# Patient Record
Sex: MALE | Race: WHITE | NOT HISPANIC OR LATINO
[De-identification: names, ages, dates, MRNs, and addresses within clinical notes are randomized per-mention and may not be internally consistent; named-entity substitution may affect disease eponyms.]

---

## 2020-04-12 PROBLEM — Z00.00 ENCOUNTER FOR PREVENTIVE HEALTH EXAMINATION: Status: ACTIVE | Noted: 2020-04-12

## 2020-04-13 ENCOUNTER — APPOINTMENT (OUTPATIENT)
Dept: THORACIC SURGERY | Facility: CLINIC | Age: 40
End: 2020-04-13
Payer: COMMERCIAL

## 2020-04-13 DIAGNOSIS — R91.1 SOLITARY PULMONARY NODULE: ICD-10-CM

## 2020-04-13 DIAGNOSIS — R59.0 LOCALIZED ENLARGED LYMPH NODES: ICD-10-CM

## 2020-04-13 DIAGNOSIS — Z86.73 PERSONAL HISTORY OF TRANSIENT ISCHEMIC ATTACK (TIA), AND CEREBRAL INFARCTION W/OUT RESIDUAL DEFICITS: ICD-10-CM

## 2020-04-13 DIAGNOSIS — Z86.39 PERSONAL HISTORY OF OTHER ENDOCRINE, NUTRITIONAL AND METABOLIC DISEASE: ICD-10-CM

## 2020-04-13 DIAGNOSIS — I25.2 OLD MYOCARDIAL INFARCTION: ICD-10-CM

## 2020-04-13 PROCEDURE — 99205 OFFICE O/P NEW HI 60 MIN: CPT

## 2020-04-16 PROBLEM — I25.2 HISTORY OF ACUTE MYOCARDIAL INFARCTION: Status: RESOLVED | Noted: 2020-04-16 | Resolved: 2020-04-16

## 2020-04-16 PROBLEM — R59.0 MEDIASTINAL ADENOPATHY: Status: ACTIVE | Noted: 2020-04-16

## 2020-04-16 PROBLEM — Z86.39 HISTORY OF THYROID NODULE: Status: RESOLVED | Noted: 2020-04-16 | Resolved: 2020-04-16

## 2020-04-16 PROBLEM — Z86.73 HISTORY OF TRANSIENT CEREBRAL ISCHEMIA: Status: RESOLVED | Noted: 2020-04-16 | Resolved: 2020-04-16

## 2020-04-16 PROBLEM — Z86.73 HISTORY OF STROKE: Status: RESOLVED | Noted: 2020-04-16 | Resolved: 2020-04-16

## 2020-04-16 PROBLEM — R91.1 LUNG NODULE: Status: ACTIVE | Noted: 2020-04-16

## 2020-04-16 RX ORDER — FAMOTIDINE 40 MG/1
40 TABLET, FILM COATED ORAL
Refills: 0 | Status: ACTIVE | COMMUNITY

## 2020-04-16 RX ORDER — CLOPIDOGREL 75 MG/1
75 TABLET, FILM COATED ORAL
Refills: 0 | Status: ACTIVE | COMMUNITY

## 2020-04-16 RX ORDER — LEVOTHYROXINE SODIUM 0.07 MG/1
75 TABLET ORAL
Refills: 0 | Status: ACTIVE | COMMUNITY

## 2020-04-16 NOTE — PHYSICAL EXAM
[General Appearance - Alert] : alert [General Appearance - In No Acute Distress] : in no acute distress [Sclera] : the sclera and conjunctiva were normal [PERRL With Normal Accommodation] : pupils were equal in size, round, and reactive to light [Extraocular Movements] : extraocular movements were intact [Outer Ear] : the ears and nose were normal in appearance [Oropharynx] : the oropharynx was normal [Neck Appearance] : the appearance of the neck was normal [Neck Cervical Mass (___cm)] : no neck mass was observed [Jugular Venous Distention Increased] : there was no jugular-venous distention [Thyroid Diffuse Enlargement] : the thyroid was not enlarged [Thyroid Nodule] : there were no palpable thyroid nodules [Auscultation Breath Sounds / Voice Sounds] : lungs were clear to auscultation bilaterally [Heart Rate And Rhythm] : heart rate was normal and rhythm regular [Heart Sounds] : normal S1 and S2 [Heart Sounds Gallop] : no gallops [Murmurs] : no murmurs [Heart Sounds Pericardial Friction Rub] : no pericardial rub [Examination Of The Chest] : the chest was normal in appearance [Chest Visual Inspection Thoracic Asymmetry] : no chest asymmetry [Diminished Respiratory Excursion] : normal chest expansion [Bowel Sounds] : normal bowel sounds [Abdomen Soft] : soft [Abdomen Tenderness] : non-tender [] : no hepato-splenomegaly [Abdomen Mass (___ Cm)] : no abdominal mass palpated [Cervical Lymph Nodes Enlarged Posterior Bilaterally] : posterior cervical [Cervical Lymph Nodes Enlarged Anterior Bilaterally] : anterior cervical [Supraclavicular Lymph Nodes Enlarged Bilaterally] : supraclavicular [Axillary Lymph Nodes Enlarged Bilaterally] : axillary [Femoral Lymph Nodes Enlarged Bilaterally] : femoral [Inguinal Lymph Nodes Enlarged Bilaterally] : inguinal [No CVA Tenderness] : no ~M costovertebral angle tenderness [No Spinal Tenderness] : no spinal tenderness

## 2020-04-16 NOTE — REASON FOR VISIT
[Initial Evaluation] : an initial evaluation [Spouse] : spouse [FreeTextEntry1] : lung nodules, hilar and mediastinal adenopathy

## 2020-04-16 NOTE — ASSESSMENT
[FreeTextEntry1] : 41 y/o male with h/o thyroid cancer, now with mediastinal and hilar adenopathy of unclear etiology.  Options for watchful waiting, pet, and biopsy were discussed.  I plan to order a pet ct to see if there is any abnormal activity.  If so, I will consider biopsy with EBUS or mediastinoscopy.  We will also repeat ct chest in 3 months to assure stability.  Pt will f/u if any change in symptoms.

## 2020-04-16 NOTE — HISTORY OF PRESENT ILLNESS
[FreeTextEntry1] : 39 y/o male with ho partial thyroidectomy for thyroid cancer in the past.  Pt with h/o tobacco.  Pt had a Transient ischemic attack.  He was evaluated in the hospital.  CT of neck and chest showed evidence of thyroid nodule.  He had mediastinal and bilateral hilar adenopathy.  Largest lymph node 1.7cm.  He also was found to have small subpleural nodules bilaterally, largest was 5mm.  Pt has recovered from TIA, now has no symptoms.  Pt here to discuss new findings on ct chest

## 2020-05-11 ENCOUNTER — APPOINTMENT (OUTPATIENT)
Dept: VASCULAR SURGERY | Facility: CLINIC | Age: 40
End: 2020-05-11
Payer: COMMERCIAL

## 2020-05-11 VITALS — SYSTOLIC BLOOD PRESSURE: 154 MMHG | HEART RATE: 77 BPM | DIASTOLIC BLOOD PRESSURE: 55 MMHG

## 2020-05-11 PROCEDURE — 99203 OFFICE O/P NEW LOW 30 MIN: CPT

## 2020-05-12 RX ORDER — ATORVASTATIN CALCIUM 80 MG/1
80 TABLET, FILM COATED ORAL
Refills: 0 | Status: ACTIVE | COMMUNITY

## 2020-05-12 NOTE — REASON FOR VISIT
[Consultation] : a consultation visit [FreeTextEntry1] : TIA with carotid intracerebral carotid stenosis

## 2020-05-12 NOTE — PHYSICAL EXAM
[Carotid Bruits] : carotid bruit  [Normal Breath Sounds] : Normal breath sounds [Normal Rate and Rhythm] : normal rate and rhythm [2+] : left 2+ [Ankle Swelling Bilaterally] : bilaterally  [No Rash or Lesion] : No rash or lesion [Alert] : alert [Oriented to Person] : oriented to person [Oriented to Place] : oriented to place [Oriented to Time] : oriented to time [JVD] : no jugular venous distention  [Ankle Swelling (On Exam)] : not present [de-identified] : Awake and Alert [de-identified] : appropriate

## 2020-05-12 NOTE — HISTORY OF PRESENT ILLNESS
[FreeTextEntry1] : 41 yo male referred by Dr. Thakkar after suffering a TIA. The patient reports that he was at work at [employer] when he started having trouble using the computer. He was sent to [facility] and found to have left sided weakness. He underwent a CTA which demonstrated a marked stenosis of his right supraclinoid carotid artery. His symptoms resolved and he was discharged home. He denies recurrent symptoms. He followed up with neurology.

## 2020-05-12 NOTE — ASSESSMENT
[FreeTextEntry1] : 41 yo male with a symptomatic intracerebral carotid lesion. He is currently on a high dose statin and an antiplatelet. I recommended that he make an appointment with a neurointerventionalist.Hewas given the names and numbers of 2 doctors. I emphasized the importance of smoking cessation and compliance with his medications. He will follow up with me on a PRN basis.

## 2020-05-12 NOTE — REVIEW OF SYSTEMS
[Fever] : no fever [Chills] : no chills [Lower Ext Edema] : no extremity edema [Limb Pain] : no limb pain [Limb Swelling] : no limb swelling [Limb Weakness] : no limb weakness [Difficulty Walking] : no difficulty walking

## 2022-06-09 ENCOUNTER — APPOINTMENT (OUTPATIENT)
Age: 42
End: 2022-06-09
Payer: MEDICAID

## 2022-06-09 PROCEDURE — 99203 OFFICE O/P NEW LOW 30 MIN: CPT

## 2022-06-10 DIAGNOSIS — F17.200 NICOTINE DEPENDENCE, UNSPECIFIED, UNCOMPLICATED: ICD-10-CM

## 2022-06-10 DIAGNOSIS — R06.83 SNORING: ICD-10-CM

## 2022-06-14 VITALS
BODY MASS INDEX: 36.71 KG/M2 | HEIGHT: 60 IN | WEIGHT: 187 LBS | DIASTOLIC BLOOD PRESSURE: 75 MMHG | SYSTOLIC BLOOD PRESSURE: 150 MMHG | HEART RATE: 91 BPM

## 2022-06-14 PROBLEM — R06.83 SNORING: Status: ACTIVE | Noted: 2022-06-14

## 2022-06-14 PROBLEM — F17.200 CURRENT EVERY DAY SMOKER: Status: ACTIVE | Noted: 2020-04-16

## 2022-06-14 RX ORDER — ASPIRIN 81 MG
81 TABLET, DELAYED RELEASE (ENTERIC COATED) ORAL
Refills: 0 | Status: ACTIVE | COMMUNITY

## 2022-06-14 NOTE — HISTORY OF PRESENT ILLNESS
[FreeTextEntry1] : Dr. Roger\par 42 year old man  with history of tia, cad s.p stent, htn  is here in the sleep center to address excessive snoring.  Patient is sleepy with [place] sleepiness score of 8.  Patient has very loud snoring which disturbs his wife, does not have any witnessed apneas.  Patient's bedtime is around 12-1 am wakes up in the morning around 8-10 am.  He feels rested when he wakes up.  Patient drinks 2-5 cups of coffee during the daytime. Patient does not have any headaches or nocturia.  He is not sleepy while driving.\par STOPBANG score - 3\par

## 2022-06-14 NOTE — ASSESSMENT
[FreeTextEntry1] : 42-year-old man with history of snoring with coronary artery disease and  stroke in the past.  We will rule out sleep apnea.  We will do a home sleep study, discussed about possibility of CPAP therapy with the patient today.

## 2022-07-19 ENCOUNTER — APPOINTMENT (OUTPATIENT)
Dept: HEART AND VASCULAR | Facility: CLINIC | Age: 42
End: 2022-07-19

## 2022-07-19 ENCOUNTER — NON-APPOINTMENT (OUTPATIENT)
Age: 42
End: 2022-07-19

## 2022-07-19 VITALS
RESPIRATION RATE: 16 BRPM | OXYGEN SATURATION: 98 % | TEMPERATURE: 97.3 F | DIASTOLIC BLOOD PRESSURE: 72 MMHG | WEIGHT: 187 LBS | HEIGHT: 60 IN | BODY MASS INDEX: 36.71 KG/M2 | SYSTOLIC BLOOD PRESSURE: 150 MMHG | HEART RATE: 100 BPM

## 2022-07-19 DIAGNOSIS — Z78.9 OTHER SPECIFIED HEALTH STATUS: ICD-10-CM

## 2022-07-19 DIAGNOSIS — Z80.41 FAMILY HISTORY OF MALIGNANT NEOPLASM OF OVARY: ICD-10-CM

## 2022-07-19 DIAGNOSIS — E78.5 HYPERLIPIDEMIA, UNSPECIFIED: ICD-10-CM

## 2022-07-19 DIAGNOSIS — R94.31 ABNORMAL ELECTROCARDIOGRAM [ECG] [EKG]: ICD-10-CM

## 2022-07-19 DIAGNOSIS — I10 ESSENTIAL (PRIMARY) HYPERTENSION: ICD-10-CM

## 2022-07-19 DIAGNOSIS — F17.200 NICOTINE DEPENDENCE, UNSPECIFIED, UNCOMPLICATED: ICD-10-CM

## 2022-07-19 DIAGNOSIS — I63.239 CEREBRAL INFARC DUE TO UNSPEC OCCLUSION OR STENOSIS OF UNSPEC CAROTID ARTERY: ICD-10-CM

## 2022-07-19 DIAGNOSIS — I25.10 ATHEROSCLEROTIC HEART DISEASE OF NATIVE CORONARY ARTERY W/OUT ANGINA PECTORIS: ICD-10-CM

## 2022-07-19 PROCEDURE — 93000 ELECTROCARDIOGRAM COMPLETE: CPT

## 2022-07-19 PROCEDURE — 99205 OFFICE O/P NEW HI 60 MIN: CPT | Mod: 25

## 2022-07-19 RX ORDER — AMLODIPINE AND VALSARTAN 10; 160 MG/1; MG/1
10-160 TABLET, FILM COATED ORAL
Refills: 0 | Status: DISCONTINUED | COMMUNITY
End: 2022-07-19

## 2022-07-19 RX ORDER — AMLODIPINE BESYLATE 10 MG/1
10 TABLET ORAL
Qty: 30 | Refills: 0 | Status: ACTIVE | COMMUNITY
Start: 2022-05-06

## 2022-07-19 RX ORDER — MULTIVITAMIN
TABLET ORAL
Refills: 0 | Status: ACTIVE | COMMUNITY

## 2022-07-19 NOTE — DISCUSSION/SUMMARY
[Prior Myocardial Infarction] : prior myocardial infarction [Coronary Artery Disease] : coronary artery disease [Weight Reduction] : weight reduction [Hyperlipidemia] : hyperlipidemia [Diet Modification] : diet modification [Exercise] : exercise [Hypertension] : hypertension [Exercise Regimen] : an exercise regimen [Dietary Modification] : dietary modification [Weight Loss] : weight loss [Low Sodium Diet] : low sodium diet [Peripheral Vascular Disease] : peripheral vascular disease [Stable] : stable [None] : There are no changes in medication management [Smoking Cessation] : smoking cessation [Exercise Rehab] : exercise rehabilitation [Patient] : the patient [de-identified] : s/p PCI at [facility] [de-identified] : carotid disease; TIA [EKG obtained to assist in diagnosis and management of assessed problem(s)] : EKG obtained to assist in diagnosis and management of assessed problem(s)

## 2022-07-19 NOTE — HISTORY OF PRESENT ILLNESS
[FreeTextEntry1] : Yimi Garzon is a 41 yo male who presents for CV evaluation.  He has been followed by Dr Balbir Baldwin in the past.\par \par He denies cp, sob, pnd, orthopnea, edema, palp, or loc.\par \par He is s/p PCI for severe CAD in acute setting - performed at [facility].  He has also been evaluated for TIA - intracranial carotid disease identified - Neuro interventionalist referral has been made but has not taken place.\par \par He is active but not exercising.  He is compliant with meds.\par \par ECG today reveals NSR, inferior infarction, age undetermined.\par \par Clinical hx reviewed in detail.\par \par Recommendations\par 1. collect records from [facility] and Dr Baldwin\par 2. collect records from [facility]\par 3. Neurosurgery/interventional referral\par 4. EXSE\par 5. continue current meds for now\par 6. smoking cessation

## 2022-07-19 NOTE — REASON FOR VISIT
[Arrhythmia/ECG Abnorrmalities] : arrhythmia/ECG abnormalities [Hyperlipidemia] : hyperlipidemia [Hypertension] : hypertension [Coronary Artery Disease] : coronary artery disease [Carotid, Aortic and Peripheral Vascular Disease] : carotid, aortic and peripheral vascular disease [FreeTextEntry3] : Kane Roger

## 2022-07-20 ENCOUNTER — APPOINTMENT (OUTPATIENT)
Dept: NEUROSURGERY | Facility: CLINIC | Age: 42
End: 2022-07-20

## 2022-07-20 DIAGNOSIS — Z01.812 ENCOUNTER FOR PREPROCEDURAL LABORATORY EXAMINATION: ICD-10-CM

## 2022-07-21 ENCOUNTER — APPOINTMENT (OUTPATIENT)
Dept: NEUROSURGERY | Facility: CLINIC | Age: 42
End: 2022-07-21

## 2022-07-21 ENCOUNTER — APPOINTMENT (OUTPATIENT)
Dept: NEUROSURGERY | Facility: HOSPITAL | Age: 42
End: 2022-07-21

## 2022-07-21 ENCOUNTER — NON-APPOINTMENT (OUTPATIENT)
Age: 42
End: 2022-07-21

## 2022-07-21 VITALS
BODY MASS INDEX: 37.3 KG/M2 | SYSTOLIC BLOOD PRESSURE: 173 MMHG | HEIGHT: 60 IN | HEART RATE: 108 BPM | DIASTOLIC BLOOD PRESSURE: 78 MMHG | WEIGHT: 190 LBS

## 2022-07-21 DIAGNOSIS — I63.9 CEREBRAL INFARCTION, UNSPECIFIED: ICD-10-CM

## 2022-07-21 DIAGNOSIS — Z92.3 PERSONAL HISTORY OF IRRADIATION: ICD-10-CM

## 2022-07-21 DIAGNOSIS — Z85.841 PERSONAL HISTORY OF MALIGNANT NEOPLASM OF BRAIN: ICD-10-CM

## 2022-07-21 PROCEDURE — 99205 OFFICE O/P NEW HI 60 MIN: CPT

## 2022-07-21 NOTE — HISTORY OF PRESENT ILLNESS
[de-identified] : YOLI MARTIN is a 42 year right-handed male with a PMH of malignant brain tumor (unknown type) at 5 years old, s/p left suboccipital craniotomy for resection at [facility] and s/p adjuvant RT, T8 benign spinal tumor s/p resection at [facility] in 1999 (unknown type), HTN, pulmonary nodules, thyroid nodule s/p partial thyroidectomy, CAD s/p MI and PCI at [facility] 2019, prior right thalamic CVA/TIA's seen by Dr. Villatoro at [facility] 2020 while compliant with ASA and Plavix, and found with right severe supraclinoid stenosis and subsequently lost to follow up who presents to the office today for neuroendovascular consultation to follow up for his severe Right intracranial ICA stenosis.  The patient denies current headaches, visual change, numbness, weakness of extremities, speech disturbance, dizziness, neck pain, vertigo. He continues to take ASA/Plavix/statin.  \par Surg Hx: Craniotomy for tumor resection age 5, partial thyroidectomy, T8 spinal tumor resection, cardiac stent. \par Meds: ASA, Plavix, Famotidine, Levothyroxine, Amlodipine\par Allergies: NKDA\par Soc Hx: current 1/2ppd smoker since age 15, social EtOH, lives with parents, works at [employer]\par \par \par

## 2022-07-21 NOTE — ASSESSMENT
[FreeTextEntry1] : I have discussed the natural history and treatment options for intracranial atherosclerosis with the patient. I explained the indications for observation and imaging surveillance, medical management, and surgery. I discussed the risks, benefits, possible complications and expected outcome related to each treatment option.  In the end, I recommend MRI brain +/- to r/o silent infarct and check for residual mass/post radiation change and CTA of the head and neck to evaluate the cerebral vasculature. The patient should continue DAPT as prescribed and should continue daily statin therapy with aim for goal LDL <70 and goal HbA1c <6.0 for stroke prevention. He should undergo accumetrics to ensure that he is adequately responding to his DAPT.  He should follow up with Dr. Juarez/Stroke Neurology.   We will follow up with him in 2-3 weeks to review imaging. We will likely order an MRA NOVA study in the near future to check the flow dynamics. The patient understands the plan of care and is in agreement.  All questions answered to patient satisfaction.\par

## 2022-07-21 NOTE — PHYSICAL EXAM
[Past-pointing] : there was no past-pointing [Tremor] : no tremor present [FreeTextEntry1] : old well-healed left suboccipital craniectomy well-healed incision

## 2022-07-21 NOTE — DATA REVIEWED
[de-identified] : \par PROCEDURE: CT ANGIO HEAD/COW; CT ANGIO NECK \par \par \par ORDER #: [id number]; [id number] \par \par CC: ; ; ; \par End of cc's \par \par History: Probable Stroke. \par \par  CTA head and neck 2/28/2020 \par \par  Thin helical axial sections through the head and neck obtained following the \par  bolus diabetes administration of contrast. Rapid CTA blood vessel density map \par  generated. The patient received 60 mL's of Isovue 370. Coronal sagittal \par  reconstructed images provided. \par \par \par  Comparison made to head CT 2/28/2020 \par \par  The study is somewhat limited segment patient motion and body habitus \par  resulting in image degradation. \par \par  CTA neck: \par \par  Great vessels: The aortic arch is normal in caliber with atherosclerotic soft \par  and calcified plaque. There is no aortic arch dissection. There is a bovine \par  configuration of the great vessels. There is mild soft plaque and stenosis \par  along the brachiocephalic artery best seen on coronal image 34 series 601. \par \par  Right carotid artery: There is contrast patency of the common and internal \par  carotid artery from origin to cavernous segment without significant stenosis, \par  occlusion or dissection. There is marked stenosis of the supraclinoid internal \par  carotid artery. See CTA head report. \par \par  Left carotid artery: There is contrast patency of the common internal carotid \par  artery from origin to skull base without significant stenosis, occlusion or \par  dissection. \par \par  Vertebral arteries: There is contrast patency of both vertebral arteries, left \par  mildly larger than right from origin to skull base without evidence of a skin \par  stenosis, occlusion or dissection. \par \par  Soft tissues: The airway is patent with the trachea deviated to the right. \par  There is a large left thyroid lobe 5.7 cm heterogeneous enhancing mass/goiter \par  extending into the superior mediastinum. There is no cervical lymphadenopathy. \par \par  Cervical spine: There is straightening of the cervical spine. There is no \par  acute fracture. At C4-5 there is a large prominent left-sided osteophyte \par  arising from the C5 left posterior lateral superior endplate compressing the \par  anterolateral aspect of the sac and extending into the neural foramen. \par \par  Upper thorax: There are bilateral multiple indeterminate pulmonary nodules. A \par  representative right upper lobe pulmonary nodule measures 5 mm on axial image \par  61 series 3. A representative left pulmonary nodule measures 6 mm on axial \par  image 31. There are enlarged mediastinal and hilar lymph nodes. A \par  representative prevascular space lymph node measures 1.5 cm axial image 50 \par  series 3. Recommend follow-up chest CT for further evaluation. \par \par  CTA head: \par \par  Anterior circulation: There is contrast patency within the petrous and \par  cavernous segments of internal carotid artery without evidence of severe \par  stenosis, occlusion or dissection. There is severe stenosis of the right \par  supraclinoid internal carotid artery extending to the carotid terminus seen on \par  axial images 345-355 of series 3 and sagittal image 39 of series 602. There is \par  contrast patency of the middle and anterior cerebral artery major vessels \par  without occlusion/abrupt cut off or any significant stenosis. \par \par  Posterior circulation: There is contrast patency of the basilar artery \par  terminating into the right posterior cerebral artery without occlusion/to \par  abrupt cut off or any significant stenosis. There is fetal origins supply of \par  the left posterior cerebral artery via patent posterior communicating artery. \par \par  There is no evidence of intracranial aneurysm or vascular malformation. \par \par  Rapid CTA blood vessel density at reveals diminished right MCA density in the \par  range of 45-75% relative to the contralateral left MCA territory. \par \par  There are no intracranial enhancing lesions. Patient is status post left \par  suboccipital craniotomy/craniectomy with left lateral cerebellar hemisphere \par  volume loss/cystic encephalomalacia. \par \par \par  IMPRESSION: \par \par  Severe/critical stenosis right ICA supraclinoid segment of internal carotid \par  artery extending to carotid terminus. \par \par  No evidence of any significant stenosis or occlusion of common carotid or \par  vertebral arteries. \par \par  No evidence of intracranial MCA vessel arterial occlusion or significant \par  stenosis. \par \par  Rapid CTA map reveals diminished blood vessel density map in right MCA \par  territory. \par \par  Atherosclerotic plaque within aortic arch and right brachiocephalic artery. \par \par  Large left thyroid mass/goiter extending into the superior mediastinum. \par \par  Bilateral indeterminant subcentimeter pulmonary nodules. \par \par  Mediastinal and bilateral hilar lymphadenopathy. \par \par  Recommend follow-up chest CT scan for further evaluation. \par \par  See further comments above. \par \par  Communication: The findings discussed with Dr. Browning at 5:42 PM. \par \par  Electronically Signed: \par  ____________________________________________ \par  Ru Michaud MD \par  02/28/20 1749  [de-identified] : \par PROCEDURE: OBV MRI BRAIN W/O CONT \par \par ORDER #: [id number] \par CC: ; ; ; \par End of cc's \par \par CLINICAL STATEMENT: Left arm weakness TIA \par \par  TECHNIQUE: MRI of the brain was performed without gadolinium. \par \par  COMPARISON: CT head 2/20/2020 \par \par  FINDINGS: \par  Study is markedly limited due to motion \par \par  There is mild diffuse parenchymal volume loss ischemic changes. \par \par  Diffusion sequence is markedly limited due to artifacts. High diffusion signal \par  measuring 1.0 x 0.9 cm noted in the anterior right thalamus region. ADC map is \par  nondiagnostic. \par \par  Few gradient susceptibility affects are noted which may be related to chronic \par  microhemorrhages. \par \par  Postsurgical changes noted in the left cerebellum right frontal lobe gliosis \par  noted \par \par  There is no acute parenchymal hemorrhage, or midline shift. There is no \par  extra-axial fluid collection. There is no hydrocephalus. Partial empty sella \par \par  Retention cyst/polyp right maxillary sinus \par \par \par \par  IMPRESSION: \par  Limited exam due to motion. \par \par  Subcentimeter high diffusion signal anterior right thalamus (nondiagnostic ADC \par  map) most compatible with acute infarct. \par \par  No acute intracranial hemorrhage \par \par  Electronically Signed: \par  ____________________________________________ \par  Thomas Farrar MD \par  02/29/20 1132

## 2022-08-12 ENCOUNTER — NON-APPOINTMENT (OUTPATIENT)
Age: 42
End: 2022-08-12

## 2022-08-14 LAB
ANION GAP SERPL CALC-SCNC: 10 MMOL/L
BUN SERPL-MCNC: 10 MG/DL
CALCIUM SERPL-MCNC: 9.5 MG/DL
CHLORIDE SERPL-SCNC: 103 MMOL/L
CO2 SERPL-SCNC: 25 MMOL/L
CREAT SERPL-MCNC: 0.89 MG/DL
EGFR: 109 ML/MIN/1.73M2
GLUCOSE SERPL-MCNC: 108 MG/DL
POTASSIUM SERPL-SCNC: 4.6 MMOL/L
SODIUM SERPL-SCNC: 139 MMOL/L

## 2022-08-16 ENCOUNTER — APPOINTMENT (OUTPATIENT)
Dept: HEART AND VASCULAR | Facility: CLINIC | Age: 42
End: 2022-08-16

## 2022-08-18 ENCOUNTER — APPOINTMENT (OUTPATIENT)
Dept: NEUROSURGERY | Facility: CLINIC | Age: 42
End: 2022-08-18

## 2022-08-18 NOTE — PHYSICAL EXAM
[FreeTextEntry1] : Constitutional: alert and in no acute distress. \par Psychiatric: oriented to person, place, and time, insight and judgment were intact and the affect was normal. \par \par Orientation: oriented to person, oriented to place and oriented to time. \par Memory: short term memory intact and remote memory intact. \par Attention: the attention span was normal and normal concentrating ability. \par Language: fluency intact and comprehension intact. \par Fund of knowledge: displays adequate knowledge of current events, adequate knowledge of personal past history and adequate range of vocabulary. \par Cranial Nerves: visual acuity intact bilaterally, pupils equal round and reactive to light, extraocular motion intact, facial sensation intact symmetrically, face symmetrical, hearing was intact bilaterally, tongue and palate midline, head turning and shoulder shrug symmetric and there was no tongue deviation with protrusion. \par Motor: muscle tone was normal in all four extremities, muscle strength was normal in all four extremities and normal bulk in all four extremities. \par Sensory exam: light touch was intact. \par Coordination:. normal gait. balance was intact. there was no past-pointing. no tremor present. \par Deep tendon reflexes: \par Biceps right 2+. Biceps left 2+.  \par Triceps right 2+. Triceps left 2+.  \par Patella right 2+. Patella left 2+.  \par Skin:. old well-healed left suboccipital craniectomy well-healed incision.

## 2022-08-18 NOTE — REASON FOR VISIT
[FreeTextEntry1] : Mr. Garzon returns today for interval follow up and imaging review of his right supraclinoid ICA stenosis in the setting of prior R thalamic CVA and TIA's.  He has undergone an MRI of the brain and CTA of the head and neck as requested. (see detailed results below).  \par \par 7/21/22: YOLI GARZON is a 42 year right-handed male with a PMH of malignant brain tumor (unknown type) at 5 years old, [facility] and s/p adjuvant RT, T8 benign spinal tumor s/p resection at [place] in 1999 (unknown type), HTN, pulmonary nodules, thyroid nodule s/p partial thyroidectomy, CAD s/p [facility], prior right thalamic CVA/TIA's seen by Dr. Villatoro at [facility] 2020 while compliant with ASA and Plavix, and found with right severe supraclinoid stenosis and subsequently lost to follow up who presents to the office today for neuroendovascular consultation to follow up for his severe Right intracranial ICA stenosis. The patient denies current headaches, visual change, numbness, weakness of extremities, speech disturbance, dizziness, neck pain, vertigo. He continues to take ASA/Plavix/statin. \par Surg Hx: Craniotomy for tumor resection age 5, partial thyroidectomy, T8 spinal tumor resection, cardiac stent. \par Meds: ASA, Plavix, Famotidine, Levothyroxine, Amlodipine\par Allergies: NKDA\par Soc Hx: current 1/2ppd smoker since age 15, social EtOH, lives with parents, works at [employer]

## 2022-08-18 NOTE — ASSESSMENT
[FreeTextEntry1] : I have discussed the natural history and treatment options for intracranial atherosclerosis with the patient. I explained the indications for observation and imaging surveillance, medical management, and surgery. I discussed the risks, benefits, possible complications and expected outcome related to each treatment option. In the end, I recommend MRI brain +/- to r/o silent infarct and check for residual mass/post radiation change and CTA of the head and neck to evaluate the cerebral vasculature. The patient should continue DAPT as prescribed and should continue daily statin therapy with aim for goal LDL <70 and goal HbA1c <6.0 for stroke prevention. He should undergo accumetrics to ensure that he is adequately responding to his DAPT. He should follow up with Dr. Juarez/Stroke Neurology. We will follow up with him in 2-3 weeks to review imaging. We will likely order an MRA NOVA study in the near future to check the flow dynamics. The patient understands the plan of care and is in agreement. All questions answered to patient satisfaction.

## 2022-08-24 ENCOUNTER — APPOINTMENT (OUTPATIENT)
Dept: PULMONOLOGY | Facility: CLINIC | Age: 42
End: 2022-08-24

## 2022-10-05 ENCOUNTER — APPOINTMENT (OUTPATIENT)
Dept: NEUROLOGY | Facility: CLINIC | Age: 42
End: 2022-10-05